# Patient Record
Sex: FEMALE | Race: OTHER | NOT HISPANIC OR LATINO | URBAN - METROPOLITAN AREA
[De-identification: names, ages, dates, MRNs, and addresses within clinical notes are randomized per-mention and may not be internally consistent; named-entity substitution may affect disease eponyms.]

---

## 2021-04-11 ENCOUNTER — EMERGENCY (EMERGENCY)
Facility: HOSPITAL | Age: 36
LOS: 1 days | Discharge: HOME | End: 2021-04-11
Admitting: EMERGENCY MEDICINE
Payer: COMMERCIAL

## 2021-04-11 ENCOUNTER — TRANSCRIPTION ENCOUNTER (OUTPATIENT)
Age: 36
End: 2021-04-11

## 2021-04-11 VITALS
TEMPERATURE: 98 F | HEART RATE: 80 BPM | SYSTOLIC BLOOD PRESSURE: 122 MMHG | OXYGEN SATURATION: 98 % | RESPIRATION RATE: 18 BRPM | DIASTOLIC BLOOD PRESSURE: 68 MMHG

## 2021-04-11 LAB — SARS-COV-2 RNA SPEC QL NAA+PROBE: DETECTED

## 2021-04-11 PROCEDURE — U0003: CPT

## 2021-04-11 PROCEDURE — U0005: CPT

## 2021-04-11 PROCEDURE — 99283 EMERGENCY DEPT VISIT LOW MDM: CPT

## 2021-04-11 PROCEDURE — 99282 EMERGENCY DEPT VISIT SF MDM: CPT

## 2021-04-11 NOTE — ED PROVIDER NOTE - PATIENT PORTAL LINK FT
You can access the FollowMyHealth Patient Portal offered by [facility] by registering at the following website: [email or web address]. By joining [employer]’s FollowMyHealth portal, you will also be able to view your health information using other applications (apps) compatible with our system.

## 2021-04-14 DIAGNOSIS — U07.1 COVID-19: ICD-10-CM

## 2021-04-14 DIAGNOSIS — Z20.822 CONTACT WITH AND (SUSPECTED) EXPOSURE TO COVID-19: ICD-10-CM

## 2021-05-11 ENCOUNTER — APPOINTMENT (OUTPATIENT)
Dept: COLORECTAL SURGERY | Facility: CLINIC | Age: 36
End: 2021-05-11
Payer: COMMERCIAL

## 2021-05-11 VITALS
BODY MASS INDEX: 22.5 KG/M2 | TEMPERATURE: 98.4 F | SYSTOLIC BLOOD PRESSURE: 115 MMHG | HEIGHT: 66 IN | HEART RATE: 69 BPM | WEIGHT: 140 LBS | DIASTOLIC BLOOD PRESSURE: 74 MMHG

## 2021-05-11 DIAGNOSIS — Z80.7 FAMILY HISTORY OF OTHER MALIGNANT NEOPLASMS OF LYMPHOID, HEMATOPOIETIC AND RELATED TISSUES: ICD-10-CM

## 2021-05-11 DIAGNOSIS — D12.9: ICD-10-CM

## 2021-05-11 DIAGNOSIS — K62.0 ANAL POLYP: ICD-10-CM

## 2021-05-11 DIAGNOSIS — Z72.3 LACK OF PHYSICAL EXERCISE: ICD-10-CM

## 2021-05-11 DIAGNOSIS — Z72.89 OTHER PROBLEMS RELATED TO LIFESTYLE: ICD-10-CM

## 2021-05-11 DIAGNOSIS — Z20.2 CONTACT WITH AND (SUSPECTED) EXPOSURE TO INFECTIONS WITH A PREDOMINANTLY SEXUAL MODE OF TRANSMISSION: ICD-10-CM

## 2021-05-11 PROCEDURE — 99072 ADDL SUPL MATRL&STAF TM PHE: CPT

## 2021-05-11 PROCEDURE — 99203 OFFICE O/P NEW LOW 30 MIN: CPT | Mod: 25

## 2021-05-11 PROCEDURE — 46600 DIAGNOSTIC ANOSCOPY SPX: CPT

## 2021-05-11 NOTE — HISTORY OF PRESENT ILLNESS
[FreeTextEntry1] : 36 y/o F presents for evaluation of polyps, referred by Dr. 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 Patient underwent OR based fulguration twice. Has not had a recurrence of condyloma since last surgery. Reports during the last procedure CRS noted an anal polyp but told patient it could be monitored \par \par Patient was recently experiencing issues with a thrombosed external hemorrhoid and presented to Dr. Omalley's office for further evaluation. Hemorrhoid is now resolved, reports intermittent flares from hemorrhoids. 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 Treated with Senna and Colace PRN \par Denies pain, itching, bleeding\par Last cervical pap completed 2020, no abnormal findings \par Recently completed anal pap smear with Dr. Omalley\par Never completed Gardasil Vaccine\par BH:BID \par Denies constipation, diarrhea or straining recently. Admits to straining in the past\par Reports adequate dietary fiber intake. Currently drinking 8 cups of water daily \par \par Last colonoscopy 3/25/21 for rectal bleeding\par - perianal skin tag\par - ileum was normal\par - one 2 mm polyp in the sigmoid colon, resected and retrieved\par - one 1 mm polyp in the rectum, resected and retrieved\par - one 4 mm polyp in the rectum, resected and retrieved\par - 3 mm polyp in the anus, biopsied\par - non-bleeding internal hemorrhoids\par \par Pathology\par A.) colon, sigmoid: hyperplastic polyp\par B.) colon, rectum: hyperplastic polyp\par C.) colon, rectum: hyperplastic polyp\par D.) Other, anal polyp: squamous papilloma \par \par \par FMH of UC in two sisters. Denies FMH of colorectal cancer\par Brother  from thymic and testicular CA, mother w/ lymphoma\par No ASA/NSAIDs alast 7 days

## 2021-05-11 NOTE — ASSESSMENT
[FreeTextEntry1] : Exam findings and diagnosis were discussed at length with patient.\par \par Recommend patient consider excision of anal polyp/papilloma.\par The nature of the procedure as well as risks and benefits were reviewed with the patient. Risk/benefits/alternatives discussed at length, including but not limited to pain, bleeding, infection, swelling, recurrence of symptoms, need for future surgery, scarring, and risk of alteration of bowel habits, which may be temporary or permanent. The preoperative, intraoperative, and postoperative management were discussed with the patient in detail. All questions were answered, patient expressed understanding, and would like to proceed with the proposed surgery. Informed consent was obtained. Ambulatory surgery instructions were given to patient.\par \par I have reviewed with the patient the clinical and natural history of human papilloma virus and its relationship to the anus. The risks and associated consequences including sexual transmission, anal warts, anal dysplasia and risk of anal cancer have been outlined. The need for long term surveillance and follow up have been detailed. Recommend patient consider Gardasil vaccination with PCP.\par Per patient, she had anal pap recently with GI provider.

## 2021-05-11 NOTE — PHYSICAL EXAM
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\par \par General no acute distress, alert and oriented\par Psych calm, pleasant demeanor, responding appropriately to questions\par Nonlabored breathing\par Ambulating without assistance\par Skin normal color and pigment, no visible lesions or rashes\par \par Anorectal Exam:\par Inspection no erythema, induration or fluctuance, no skin excoriation, no fissure, minimal external hemorrhoidal folds nonthrombosed, no perianal skin lesions\par DELBERT nontender, no masses palpated, no blood on gloved finger, polypoid tissue palpated right anterior quadrant\par \par Procedure: Anoscopy\par \par Pre procedure Diagnosis: anal polyp/papilloma\par Post procedure Diagnosis: anal polyp/papilloma\par Anesthesia: none\par Estimated blood loss: none\par Specimen: none\par Complications: none\par \par Consent obtained. Anoscopy was performed by passing a lighted anoscope with lubricant jelly into the anal canal and the entire anal mucosal surface was inspected. Findings included no fissure, mild internal hemorrhoids, right anterior quadrant anal polyp/papilloma arising from dentate line\par \par Patient tolerated examination and procedure well.\par \par \par

## 2021-06-24 ENCOUNTER — TRANSCRIPTION ENCOUNTER (OUTPATIENT)
Age: 36
End: 2021-06-24

## 2021-06-25 ENCOUNTER — APPOINTMENT (OUTPATIENT)
Dept: COLORECTAL SURGERY | Facility: CLINIC | Age: 36
End: 2021-06-25

## 2021-06-25 ENCOUNTER — RESULT REVIEW (OUTPATIENT)
Age: 36
End: 2021-06-25

## 2021-06-25 ENCOUNTER — OUTPATIENT (OUTPATIENT)
Dept: OUTPATIENT SERVICES | Facility: HOSPITAL | Age: 36
LOS: 1 days | Discharge: HOME | End: 2021-06-25
Payer: COMMERCIAL

## 2021-06-25 PROCEDURE — 46922 EXCISION OF ANAL LESION(S): CPT | Mod: GC

## 2021-06-25 PROCEDURE — 88305 TISSUE EXAM BY PATHOLOGIST: CPT | Mod: 26

## 2021-06-25 RX ORDER — DOCUSATE SODIUM 100 MG
1 CAPSULE ORAL
Qty: 30 | Refills: 0
Start: 2021-06-25 | End: 2021-07-24

## 2021-06-28 ENCOUNTER — NON-APPOINTMENT (OUTPATIENT)
Age: 36
End: 2021-06-28

## 2021-06-28 LAB — SURGICAL PATHOLOGY STUDY: SIGNIFICANT CHANGE UP

## 2021-06-28 RX ORDER — OXYCODONE AND ACETAMINOPHEN 5; 325 MG/1; MG/1
5-325 TABLET ORAL EVERY 6 HOURS
Qty: 12 | Refills: 0 | Status: DISCONTINUED | COMMUNITY
Start: 2021-06-28 | End: 2021-06-28

## 2021-06-30 ENCOUNTER — NON-APPOINTMENT (OUTPATIENT)
Age: 36
End: 2021-06-30

## 2021-09-02 ENCOUNTER — NON-APPOINTMENT (OUTPATIENT)
Age: 36
End: 2021-09-02

## 2021-09-02 ENCOUNTER — APPOINTMENT (OUTPATIENT)
Dept: COLORECTAL SURGERY | Facility: CLINIC | Age: 36
End: 2021-09-02
Payer: COMMERCIAL

## 2021-09-02 VITALS
HEIGHT: 66 IN | TEMPERATURE: 98.2 F | WEIGHT: 140 LBS | SYSTOLIC BLOOD PRESSURE: 127 MMHG | HEART RATE: 76 BPM | DIASTOLIC BLOOD PRESSURE: 86 MMHG | BODY MASS INDEX: 22.5 KG/M2

## 2021-09-02 DIAGNOSIS — K64.5 PERIANAL VENOUS THROMBOSIS: ICD-10-CM

## 2021-09-02 DIAGNOSIS — L91.8 OTHER HYPERTROPHIC DISORDERS OF THE SKIN: ICD-10-CM

## 2021-09-02 PROCEDURE — 99213 OFFICE O/P EST LOW 20 MIN: CPT

## 2021-09-02 NOTE — PHYSICAL EXAM
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

## 2021-09-02 NOTE — HISTORY OF PRESENT ILLNESS
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 Patient underwent OR based fulguration twice. Has not had a recurrence of condyloma since last surgery\par \par S/p excision of anal polyp on 21\par \par Final Diagnosis\par 1. Right anterior anus, polypectomy:\par - Fibroepithelial polyp\par \par 2. Posterior midline anus, polypectomy:\par - Fibroepithelial polyp\par \par Reports severe pain first 2 weeks following surgery \par Since surgery she states she has been having issues with hemorrhoids that she attributes to the surgery. States intermittently she has BRBPR on the TP and pain from an external vs. prolapsing internal hemorrhoids\par Evaluated at urgent care for leg infection and hemorrhoids. Prescribed cortisone ointment, using chalk paste, prep H ointment and sitz baths \par BH: Daily\par Denies constipation or diarrhea. Admits to occasional straining\par Using Senna or Dulcolax PRN \par Reports adequate dietary fiber intake. Currently drinking 6 cups of water daily \par Last colonoscopy 3/25/21, several hyperplastic polyps resected and retrieved\par FMH of UC in two sisters. Denies FMH of colorectal cancer\par Brother  from thymic and testicular CA, mother w/ lymphoma\par No ASA last 7 days. Took NSAID 4 days ago

## 2021-09-02 NOTE — ASSESSMENT
[FreeTextEntry1] : Patient reassured.\par Pathology discussed. \par Hemorrhoidal flare in postoperative period\par Continue high fiber diet, adequate oral hydration, stool softeners and sitz baths as needed\par Avoid constipation and straining\par Medical management, such as hydrocortisone cream, was discussed.\par Natural history of thrombosed hemorrhoids discussed with patient. \par Patient to f/u in 2-3 weeks for reevaluation or sooner as needed.\par Discussed consideration for anoscopy and further hemorrhoidal management, such as rubber band ligation, if thrombosis resolves with supportive care.\par All questions answered, patient expressed understanding and is agreeable to this plan.\par \par \par

## 2021-10-12 ENCOUNTER — APPOINTMENT (OUTPATIENT)
Dept: FAMILY MEDICINE | Facility: CLINIC | Age: 36
End: 2021-10-12

## 2021-11-08 ENCOUNTER — TRANSCRIPTION ENCOUNTER (OUTPATIENT)
Age: 36
End: 2021-11-08

## 2022-02-17 ENCOUNTER — TRANSCRIPTION ENCOUNTER (OUTPATIENT)
Age: 37
End: 2022-02-17

## 2023-04-12 ENCOUNTER — NON-APPOINTMENT (OUTPATIENT)
Age: 38
End: 2023-04-12

## 2023-04-13 ENCOUNTER — APPOINTMENT (OUTPATIENT)
Dept: COLORECTAL SURGERY | Facility: CLINIC | Age: 38
End: 2023-04-13
Payer: COMMERCIAL

## 2023-04-13 VITALS
HEART RATE: 79 BPM | HEIGHT: 66 IN | WEIGHT: 145 LBS | TEMPERATURE: 98.3 F | SYSTOLIC BLOOD PRESSURE: 114 MMHG | DIASTOLIC BLOOD PRESSURE: 81 MMHG | BODY MASS INDEX: 23.3 KG/M2

## 2023-04-13 DIAGNOSIS — K62.89 OTHER SPECIFIED DISEASES OF ANUS AND RECTUM: ICD-10-CM

## 2023-04-13 DIAGNOSIS — K64.4 RESIDUAL HEMORRHOIDAL SKIN TAGS: ICD-10-CM

## 2023-04-13 PROCEDURE — 46600 DIAGNOSTIC ANOSCOPY SPX: CPT

## 2023-04-13 NOTE — PHYSICAL EXAM
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\par \par General no acute distress, alert and oriented\par Psych responding appropriately to questions\par Nonlabored breathing\par Ambulating without assistance\par Skin normal color and pigment\par \par \par Anorectal Exam:\par Inspection no cellulitis or skin changes, no fissure, left lateral residual external hemorrhoid without inflammation or thrombosis\par DELBERT nontender, no masses palpated, no blood on gloved finger\par \par Procedure: Anoscopy\par \par Pre procedure Diagnosis: external hemorrhoid\par Post procedure Diagnosis: external hemorrhoid, hypertrophied anal papilla\par Anesthesia: none\par Estimated blood loss: none\par Specimen: none\par Complications: none\par \par Consent obtained. Anoscopy was performed by passing a lighted anoscope with lubricant jelly into the anal canal and the entire anal mucosal surface was inspected. Findings included no fissure, mild internal hemorrhoids, right anterior hypertrophied anal papilla\par \par Patient tolerated examination and procedure well.\par \par \par

## 2023-04-13 NOTE — ASSESSMENT
[FreeTextEntry1] : Exam findings and diagnosis were discussed at length with patient. \par External hemorrhoid noted on exam, no internal hemorrhoids on anoscopy. Hypertrophied anal papilla also seen on anoscopy. \par Management options discussed, including supportive care. Witch hazel and hydrocortisone as needed for symptoms were discussed.\par Importance of management of constipation discussed. \par \par Patient is interested in surgical removal.\par The nature of the procedure as well as risks and benefits were reviewed with the patient. \par Given anticipated recovery, she would like to pursue ambulatory surgery Winter 2023. Recommend f/u Fall 2023 to reevaluate and continue surgical planning.\par \par All questions were answered, patient expressed understanding, and is agreeable to this plan.\par

## 2023-04-13 NOTE — HISTORY OF PRESENT ILLNESS
[FreeTextEntry1] : 36 yo F presents for follow up\par \par Previously seen for anal polyp, s/p excision of polyps at right anterior anus and posterior midline anus 6/25/21, pathology c/w fibroepithelial polyp\par \par H/o anal condyloma 12 years ago. Patient underwent OR based fulguration twice. Has not had a recurrence of condyloma since last surgery\par \par Last seen 9/2/21 c/o hemorrhoids associated w/ intermittent BRB on TP and pain from possible external vs prolapsing hemorrhoids. Had used prescription cortisone as well as chalk paste, Prep H and sitz baths.\par Exam noted soft edematous left lateral external hemorrhoid w/ resolving thrombosis, DELBERT mildly tender. Pt reassured regarding hemorrhoidal flare in post-op period. Optimization of bowel habits and use of topical hydrocortisone reviewed. Recommended to f/u in 2-3 weeks.\par \par Pt presents today in follow up examination and routine check up\par \par States it took a while to heal from procedure, but has been well for the last several months\par Admits to external tissue that is always present and unsightly. Admits this area gets irritated when constipated and may have scant BRB on wipe when straining. Constipation tends to occur when she travels and relieved by taking dulcolax combo or senna.  Admits to occasional discomfort and irritation after straining which she treats w/ Prep H w/ relief. Last occurred a few months ago\par \par Denies stool streaking on underwear. She typically cleans w/ bidet and wipes.\par Denies ASA use. Took Tylenol this past week w/ menstrual cramps.

## 2023-07-09 ENCOUNTER — NON-APPOINTMENT (OUTPATIENT)
Age: 38
End: 2023-07-09

## 2024-03-08 ENCOUNTER — NON-APPOINTMENT (OUTPATIENT)
Age: 39
End: 2024-03-08

## 2025-06-16 ENCOUNTER — NON-APPOINTMENT (OUTPATIENT)
Age: 40
End: 2025-06-16

## 2025-07-11 ENCOUNTER — NON-APPOINTMENT (OUTPATIENT)
Age: 40
End: 2025-07-11